# Patient Record
Sex: MALE | Race: WHITE | NOT HISPANIC OR LATINO | URBAN - METROPOLITAN AREA
[De-identification: names, ages, dates, MRNs, and addresses within clinical notes are randomized per-mention and may not be internally consistent; named-entity substitution may affect disease eponyms.]

---

## 2017-05-17 PROBLEM — Z00.00 ENCOUNTER FOR PREVENTIVE HEALTH EXAMINATION: Status: ACTIVE | Noted: 2017-05-17

## 2020-07-07 ENCOUNTER — EMERGENCY (EMERGENCY)
Facility: HOSPITAL | Age: 70
LOS: 1 days | Discharge: HOME | End: 2020-07-07
Attending: EMERGENCY MEDICINE | Admitting: EMERGENCY MEDICINE
Payer: MEDICARE

## 2020-07-07 VITALS — TEMPERATURE: 99 F

## 2020-07-07 VITALS
TEMPERATURE: 101 F | RESPIRATION RATE: 18 BRPM | SYSTOLIC BLOOD PRESSURE: 132 MMHG | DIASTOLIC BLOOD PRESSURE: 73 MMHG | WEIGHT: 212.97 LBS | HEIGHT: 71 IN | HEART RATE: 101 BPM | OXYGEN SATURATION: 96 %

## 2020-07-07 DIAGNOSIS — R50.9 FEVER, UNSPECIFIED: ICD-10-CM

## 2020-07-07 LAB
ALBUMIN SERPL ELPH-MCNC: 3.5 G/DL — SIGNIFICANT CHANGE UP (ref 3.3–5)
ALP SERPL-CCNC: 60 U/L — SIGNIFICANT CHANGE UP (ref 40–120)
ALT FLD-CCNC: 21 U/L — SIGNIFICANT CHANGE UP (ref 10–45)
ANION GAP SERPL CALC-SCNC: 15 MMOL/L — SIGNIFICANT CHANGE UP (ref 5–17)
APPEARANCE UR: ABNORMAL
APTT BLD: 30.5 SEC — SIGNIFICANT CHANGE UP (ref 27.5–35.5)
AST SERPL-CCNC: 14 U/L — SIGNIFICANT CHANGE UP (ref 10–40)
BACTERIA # UR AUTO: ABNORMAL /HPF
BASOPHILS # BLD AUTO: 0.02 K/UL — SIGNIFICANT CHANGE UP (ref 0–0.2)
BASOPHILS NFR BLD AUTO: 0.1 % — SIGNIFICANT CHANGE UP (ref 0–2)
BILIRUB SERPL-MCNC: 1 MG/DL — SIGNIFICANT CHANGE UP (ref 0.2–1.2)
BILIRUB UR-MCNC: NEGATIVE — SIGNIFICANT CHANGE UP
BUN SERPL-MCNC: 18 MG/DL — SIGNIFICANT CHANGE UP (ref 7–23)
CALCIUM SERPL-MCNC: 9.2 MG/DL — SIGNIFICANT CHANGE UP (ref 8.4–10.5)
CHLORIDE SERPL-SCNC: 102 MMOL/L — SIGNIFICANT CHANGE UP (ref 96–108)
CO2 SERPL-SCNC: 21 MMOL/L — LOW (ref 22–31)
COLOR SPEC: ABNORMAL
COMMENT - URINE: SIGNIFICANT CHANGE UP
CREAT SERPL-MCNC: 1.35 MG/DL — HIGH (ref 0.5–1.3)
DIFF PNL FLD: ABNORMAL
EOSINOPHIL # BLD AUTO: 0.07 K/UL — SIGNIFICANT CHANGE UP (ref 0–0.5)
EOSINOPHIL NFR BLD AUTO: 0.5 % — SIGNIFICANT CHANGE UP (ref 0–6)
EPI CELLS # UR: SIGNIFICANT CHANGE UP
GLUCOSE SERPL-MCNC: 126 MG/DL — HIGH (ref 70–99)
GLUCOSE UR QL: NEGATIVE — SIGNIFICANT CHANGE UP
HCT VFR BLD CALC: 42.9 % — SIGNIFICANT CHANGE UP (ref 39–50)
HGB BLD-MCNC: 14.8 G/DL — SIGNIFICANT CHANGE UP (ref 13–17)
IMM GRANULOCYTES NFR BLD AUTO: 0.5 % — SIGNIFICANT CHANGE UP (ref 0–1.5)
INR BLD: 1.17 RATIO — HIGH (ref 0.88–1.16)
KETONES UR-MCNC: NEGATIVE — SIGNIFICANT CHANGE UP
LACTATE SERPL-SCNC: 1 MMOL/L — SIGNIFICANT CHANGE UP (ref 0.7–2)
LEUKOCYTE ESTERASE UR-ACNC: ABNORMAL
LYMPHOCYTES # BLD AUTO: 0.48 K/UL — LOW (ref 1–3.3)
LYMPHOCYTES # BLD AUTO: 3.3 % — LOW (ref 13–44)
MCHC RBC-ENTMCNC: 31.8 PG — SIGNIFICANT CHANGE UP (ref 27–34)
MCHC RBC-ENTMCNC: 34.5 GM/DL — SIGNIFICANT CHANGE UP (ref 32–36)
MCV RBC AUTO: 92.1 FL — SIGNIFICANT CHANGE UP (ref 80–100)
MONOCYTES # BLD AUTO: 0.88 K/UL — SIGNIFICANT CHANGE UP (ref 0–0.9)
MONOCYTES NFR BLD AUTO: 6.1 % — SIGNIFICANT CHANGE UP (ref 2–14)
NEUTROPHILS # BLD AUTO: 12.95 K/UL — HIGH (ref 1.8–7.4)
NEUTROPHILS NFR BLD AUTO: 89.5 % — HIGH (ref 43–77)
NITRITE UR-MCNC: POSITIVE
NRBC # BLD: 0 /100 WBCS — SIGNIFICANT CHANGE UP (ref 0–0)
PH UR: 5 — SIGNIFICANT CHANGE UP (ref 5–8)
PLATELET # BLD AUTO: 285 K/UL — SIGNIFICANT CHANGE UP (ref 150–400)
POTASSIUM SERPL-MCNC: 3.9 MMOL/L — SIGNIFICANT CHANGE UP (ref 3.5–5.3)
POTASSIUM SERPL-SCNC: 3.9 MMOL/L — SIGNIFICANT CHANGE UP (ref 3.5–5.3)
PROT SERPL-MCNC: 7 G/DL — SIGNIFICANT CHANGE UP (ref 6–8.3)
PROT UR-MCNC: 100
PROTHROM AB SERPL-ACNC: 14.1 SEC — HIGH (ref 10.6–13.6)
RBC # BLD: 4.66 M/UL — SIGNIFICANT CHANGE UP (ref 4.2–5.8)
RBC # FLD: 12.5 % — SIGNIFICANT CHANGE UP (ref 10.3–14.5)
RBC CASTS # UR COMP ASSIST: ABNORMAL /HPF (ref 0–4)
SODIUM SERPL-SCNC: 138 MMOL/L — SIGNIFICANT CHANGE UP (ref 135–145)
SP GR SPEC: 1.01 — SIGNIFICANT CHANGE UP (ref 1.01–1.02)
UROBILINOGEN FLD QL: NEGATIVE — SIGNIFICANT CHANGE UP
WBC # BLD: 14.47 K/UL — HIGH (ref 3.8–10.5)
WBC # FLD AUTO: 14.47 K/UL — HIGH (ref 3.8–10.5)
WBC UR QL: ABNORMAL /HPF (ref 0–5)

## 2020-07-07 PROCEDURE — 85730 THROMBOPLASTIN TIME PARTIAL: CPT

## 2020-07-07 PROCEDURE — 85027 COMPLETE CBC AUTOMATED: CPT

## 2020-07-07 PROCEDURE — 96365 THER/PROPH/DIAG IV INF INIT: CPT | Mod: XU

## 2020-07-07 PROCEDURE — 93010 ELECTROCARDIOGRAM REPORT: CPT

## 2020-07-07 PROCEDURE — 51702 INSERT TEMP BLADDER CATH: CPT

## 2020-07-07 PROCEDURE — 71045 X-RAY EXAM CHEST 1 VIEW: CPT

## 2020-07-07 PROCEDURE — 87186 SC STD MICRODIL/AGAR DIL: CPT

## 2020-07-07 PROCEDURE — 81001 URINALYSIS AUTO W/SCOPE: CPT

## 2020-07-07 PROCEDURE — 93005 ELECTROCARDIOGRAM TRACING: CPT

## 2020-07-07 PROCEDURE — 80053 COMPREHEN METABOLIC PANEL: CPT

## 2020-07-07 PROCEDURE — 85610 PROTHROMBIN TIME: CPT

## 2020-07-07 PROCEDURE — 74176 CT ABD & PELVIS W/O CONTRAST: CPT

## 2020-07-07 PROCEDURE — 87040 BLOOD CULTURE FOR BACTERIA: CPT

## 2020-07-07 PROCEDURE — U0003: CPT

## 2020-07-07 PROCEDURE — 87086 URINE CULTURE/COLONY COUNT: CPT

## 2020-07-07 PROCEDURE — 71045 X-RAY EXAM CHEST 1 VIEW: CPT | Mod: 26

## 2020-07-07 PROCEDURE — 36415 COLL VENOUS BLD VENIPUNCTURE: CPT

## 2020-07-07 PROCEDURE — 99284 EMERGENCY DEPT VISIT MOD MDM: CPT | Mod: CS

## 2020-07-07 PROCEDURE — 74176 CT ABD & PELVIS W/O CONTRAST: CPT | Mod: 26

## 2020-07-07 PROCEDURE — 83605 ASSAY OF LACTIC ACID: CPT

## 2020-07-07 PROCEDURE — 99284 EMERGENCY DEPT VISIT MOD MDM: CPT | Mod: 25

## 2020-07-07 RX ORDER — CEFTRIAXONE 500 MG/1
1000 INJECTION, POWDER, FOR SOLUTION INTRAMUSCULAR; INTRAVENOUS ONCE
Refills: 0 | Status: COMPLETED | OUTPATIENT
Start: 2020-07-07 | End: 2020-07-07

## 2020-07-07 RX ORDER — CEPHALEXIN 500 MG
1 CAPSULE ORAL
Qty: 30 | Refills: 0
Start: 2020-07-07 | End: 2020-07-16

## 2020-07-07 RX ORDER — SODIUM CHLORIDE 9 MG/ML
2300 INJECTION INTRAMUSCULAR; INTRAVENOUS; SUBCUTANEOUS ONCE
Refills: 0 | Status: COMPLETED | OUTPATIENT
Start: 2020-07-07 | End: 2020-07-07

## 2020-07-07 RX ADMIN — CEFTRIAXONE 1000 MILLIGRAM(S): 500 INJECTION, POWDER, FOR SOLUTION INTRAMUSCULAR; INTRAVENOUS at 17:47

## 2020-07-07 RX ADMIN — SODIUM CHLORIDE 2300 MILLILITER(S): 9 INJECTION INTRAMUSCULAR; INTRAVENOUS; SUBCUTANEOUS at 17:47

## 2020-07-07 RX ADMIN — SODIUM CHLORIDE 2300 MILLILITER(S): 9 INJECTION INTRAMUSCULAR; INTRAVENOUS; SUBCUTANEOUS at 16:25

## 2020-07-07 RX ADMIN — CEFTRIAXONE 100 MILLIGRAM(S): 500 INJECTION, POWDER, FOR SOLUTION INTRAMUSCULAR; INTRAVENOUS at 16:25

## 2020-07-07 NOTE — ED PROVIDER NOTE - ATTENDING CONTRIBUTION TO CARE
Eval with SANDRA Thorpe. Patient presents to ED alert and oriented x3 with complaints of fever of 102.3 at home. Patient had lithotripsy procedure last week on Monday. Patient complains of urinary retention after ly removal on thursday. Patient states he self catheterizes himself at home, last time at 2pm today. Patient states he does not feel the urge to urinate at this moment but feels extreme lower pelvic pressure. Patient took tylenol at home for fever at 330pm. Patient denies chest pain, SOB, or other complaints at this time.  Urologist: Dr Gallito Quinteros.  spoke to LEESA Quintana from Dr. Figueroa office and pt was instructed not to self cath because of increased risk of infection. if pt well appearing can dc home on oral abx. discussed pts vitals meet sepsis criteria so will work him up for sepsis and reevaluate. Consider admission given pt presenting vitals, etc. Plan to discuss with urologist and determine disposition depending on pts ability to f/u and remain vigilant to return prn worsening. Awaiting call back. SANDRA Thorpe will discuss and dispo accordingly. Pt is retired physician and reliable. I performed a face to face bedside interview with patient regarding history of present illness, review of symptoms and past medical history. I completed an independent physical exam.  I have discussed the patient's plan of care with Physician Assistant (PA). I agree with note as stated above, having amended the EMR as needed to reflect my findings.   This includes History of Present Illness, HIV, Past Medical/Surgical/Family/Social History, Allergies and Home Medications, Review of Systems, Physical Exam, and any Progress Notes during the time I functioned as the attending physician for this patient.

## 2020-07-07 NOTE — ED PROVIDER NOTE - PHYSICAL EXAMINATION
General:     NAD, well-nourished, well-appearing  Eyes: PERRL  Head:     NC/AT, EOMI, oral mucosa moist  Neck:     trachea midline  Lungs:     CTA b/l  CVS:     RRR  Abd:     bladder distended, tender, no CVA TTP  Ext:   no deformities   Neuro: AAOx3, no sensory/motor deficits

## 2020-07-07 NOTE — ED ADULT NURSE NOTE - OBJECTIVE STATEMENT
Patient presents to ED alert and oriented x3 with complaints of fever of 102.3 at home. Patient had lithotripsy procedure last week on Monday. Patient complains of urinary retention after ly removal on thursday. Patient states he self catheterizes himself at home, last time at 2pm today. Patient states he does not feel the urge to urinate at this moment. Patient took tylenol at home for fever at 330pm. Patient denies chest pain, SOB, or other complaints at this time.

## 2020-07-07 NOTE — ED PROVIDER NOTE - NSFOLLOWUPINSTRUCTIONS_ED_ALL_ED_FT
Follow up with Dr.   Take antibiotics as prescribed.    Increase fluids.   Worsening pain, new fever, chills, nausea, vomiting return to ER       Urinary Tract Infection    A urinary tract infection (UTI) is an infection of any part of the urinary tract, which includes the kidneys, ureters, bladder, and urethra. Risk factors include ignoring your need to urinate, wiping back to front if female, being an uncircumcised male, and having diabetes or a weak immune system. Symptoms include frequent urination, pain or burning with urination, foul smelling urine, cloudy urine, pain in the lower abdomen, blood in the urine, and fever. If you were prescribed an antibiotic medicine, take it as told by your health care provider. Do not stop taking the antibiotic even if you start to feel better.    SEEK IMMEDIATE MEDICAL CARE IF YOU HAVE ANY OF THE FOLLOWING SYMPTOMS: severe back or abdominal pain, fever, inability to keep fluids or medicine down, dizziness/lightheadedness, or a change in mental status. Follow up with Dr. Quinteros. Call tomorrow morning to schedule a time to be seen 7/8/2020  Take antibiotics as prescribed.    Increase fluids.   Worsening pain, new fever, chills, nausea, vomiting return to ER       Urinary Tract Infection    A urinary tract infection (UTI) is an infection of any part of the urinary tract, which includes the kidneys, ureters, bladder, and urethra. Risk factors include ignoring your need to urinate, wiping back to front if female, being an uncircumcised male, and having diabetes or a weak immune system. Symptoms include frequent urination, pain or burning with urination, foul smelling urine, cloudy urine, pain in the lower abdomen, blood in the urine, and fever. If you were prescribed an antibiotic medicine, take it as told by your health care provider. Do not stop taking the antibiotic even if you start to feel better.    SEEK IMMEDIATE MEDICAL CARE IF YOU HAVE ANY OF THE FOLLOWING SYMPTOMS: severe back or abdominal pain, fever, inability to keep fluids or medicine down, dizziness/lightheadedness, or a change in mental status.

## 2020-07-07 NOTE — ED PROVIDER NOTE - CLINICAL SUMMARY MEDICAL DECISION MAKING FREE TEXT BOX
Urologist: Dr Gallito Quinteros. Patient presents to ED alert and oriented x3 with complaints of fever of 102.3 at home. Patient had lithotripsy procedure last week on Monday. Patient complains of urinary retention after ly removal on thursday. Patient states he self catheterizes himself at home, last time at 2pm today. Patient states he does not feel the urge to urinate at this moment but feels extreme lower pelvic pressure. Patient took tylenol at home for fever at 330pm. Patient denies chest pain, SOB, or other complaints at this time.  Urologist: Dr Gallito Quinteros.  spoke to LEESA Quintana from Dr. Figueroa office and pt was instructed not to self cath because of increased risk of infection. if pt well appearing can dc home on oral abx. discussed pts vitals meet sepsis criteria so will work him up for sepsis and reevaluate  discussed with Dr. Figueroa office pts results. pt well appearing. will dc with keflex tid and will follow up in the office tomorrow. Discussed with patient need to return to ED if symptoms don't continue to improve or recur or develops any new or worsening symptoms that are of concern. Patient presents to ED alert and oriented x3 with complaints of fever of 102.3 at home. Patient had lithotripsy procedure last week on Monday. Patient complains of urinary retention after ly removal on thursday. Patient states he self catheterizes himself at home, last time at 2pm today. Patient states he does not feel the urge to urinate at this moment but feels extreme lower pelvic pressure. Patient took tylenol at home for fever at 330pm. Patient denies chest pain, SOB, or other complaints at this time.  Urologist: Dr Gallito Quinteros.  spoke to LEESA Quintana from Dr. Figueroa office and pt was instructed not to self cath because of increased risk of infection. if pt well appearing can dc home on oral abx. discussed pts vitals meet sepsis criteria so will work him up for sepsis and reevaluate.  discussed with Dr. Figueroa office pts results. pt well appearing. will dc with keflex tid and will follow up in the office tomorrow. Discussed with patient need to return to ED if symptoms don't continue to improve or recur or develops any new or worsening symptoms that are of concern.

## 2020-07-07 NOTE — ED PROVIDER NOTE - OBJECTIVE STATEMENT
Patient presents to ED alert and oriented x3 with complaints of fever of 102.3 at home. Patient had lithotripsy procedure last week on Monday. Patient complains of urinary retention after ly removal on thursday. Patient states he self catheterizes himself at home, last time at 2pm today. Patient states he does not feel the urge to urinate at this moment but feels extreme lower pelvic pressure. Patient took tylenol at home for fever at 330pm. Patient denies chest pain, SOB, or other complaints at this time.

## 2020-07-07 NOTE — ED ADULT TRIAGE NOTE - CHIEF COMPLAINT QUOTE
Pt c/o fever started last night, s/p lithotripsy last week, Monday, ly removed last Thursday, started self catheterizing last Friday due to retention, took 2 tylenol at 330 pm

## 2020-07-07 NOTE — ED ADULT NURSE REASSESSMENT NOTE - NS ED NURSE REASSESS COMMENT FT1
ly cath inserted without any difficulty, urine draining, patient reports feeling better Not applicable

## 2020-07-07 NOTE — ED PROVIDER NOTE - PATIENT PORTAL LINK FT
You can access the FollowMyHealth Patient Portal offered by [facility] by registering at the following website: [email or web address]. By joining [facility]’s FollowMyHealth portal, you will also be able to view your health information using other applications (apps) compatible with our system.

## 2020-07-07 NOTE — ED PROVIDER NOTE - PROGRESS NOTE DETAILS
discussed with Dr. Figueroa office pts results. pt well appearing. will dc with keflex tid and will follow up in the office tomorrow. Discussed with patient need to return to ED if symptoms don't continue to improve or recur or develops any new or worsening symptoms that are of concern. spoke to LEESA Quintana from Dr. Figueroa office and pt was instructed not to self cath because of increased risk of infection. if pt well appearing can dc home on oral abx. discussed pts vitals meet sepsis criteria so will work him up for sepsis and reevaluate

## 2020-07-08 LAB — SARS-COV-2 RNA SPEC QL NAA+PROBE: SIGNIFICANT CHANGE UP

## 2020-07-12 LAB
CULTURE RESULTS: SIGNIFICANT CHANGE UP
CULTURE RESULTS: SIGNIFICANT CHANGE UP
SPECIMEN SOURCE: SIGNIFICANT CHANGE UP
SPECIMEN SOURCE: SIGNIFICANT CHANGE UP

## 2021-05-12 PROBLEM — N40.0 BENIGN PROSTATIC HYPERPLASIA WITHOUT LOWER URINARY TRACT SYMPTOMS: Chronic | Status: ACTIVE | Noted: 2020-07-07

## 2021-05-13 ENCOUNTER — APPOINTMENT (OUTPATIENT)
Dept: PULMONOLOGY | Facility: CLINIC | Age: 71
End: 2021-05-13
Payer: MEDICARE

## 2021-05-13 VITALS
SYSTOLIC BLOOD PRESSURE: 146 MMHG | TEMPERATURE: 98.9 F | DIASTOLIC BLOOD PRESSURE: 96 MMHG | HEART RATE: 80 BPM | BODY MASS INDEX: 31.5 KG/M2 | WEIGHT: 220 LBS | HEIGHT: 70 IN | OXYGEN SATURATION: 97 %

## 2021-05-13 DIAGNOSIS — Z86.2 PERSONAL HISTORY OF DISEASES OF THE BLOOD AND BLOOD-FORMING ORGANS AND CERTAIN DISORDERS INVOLVING THE IMMUNE MECHANISM: ICD-10-CM

## 2021-05-13 DIAGNOSIS — Z87.891 PERSONAL HISTORY OF NICOTINE DEPENDENCE: ICD-10-CM

## 2021-05-13 DIAGNOSIS — I10 ESSENTIAL (PRIMARY) HYPERTENSION: ICD-10-CM

## 2021-05-13 DIAGNOSIS — R06.02 SHORTNESS OF BREATH: ICD-10-CM

## 2021-05-13 DIAGNOSIS — R06.2 WHEEZING: ICD-10-CM

## 2021-05-13 DIAGNOSIS — Z80.1 FAMILY HISTORY OF MALIGNANT NEOPLASM OF TRACHEA, BRONCHUS AND LUNG: ICD-10-CM

## 2021-05-13 PROCEDURE — 94729 DIFFUSING CAPACITY: CPT

## 2021-05-13 PROCEDURE — 94727 GAS DIL/WSHOT DETER LNG VOL: CPT

## 2021-05-13 PROCEDURE — 99203 OFFICE O/P NEW LOW 30 MIN: CPT | Mod: 25

## 2021-05-13 PROCEDURE — 94060 EVALUATION OF WHEEZING: CPT

## 2021-05-13 RX ORDER — AMLODIPINE BESYLATE 5 MG/1
5 TABLET ORAL
Refills: 0 | Status: ACTIVE | COMMUNITY
Start: 2021-05-13

## 2021-05-13 RX ORDER — ALBUTEROL SULFATE 90 UG/1
108 (90 BASE) INHALANT RESPIRATORY (INHALATION)
Qty: 1 | Refills: 3 | Status: ACTIVE | COMMUNITY
Start: 2021-05-13 | End: 1900-01-01

## 2021-05-13 RX ORDER — LOSARTAN POTASSIUM 50 MG/1
50 TABLET, FILM COATED ORAL
Refills: 0 | Status: ACTIVE | COMMUNITY
Start: 2021-05-13

## 2021-05-13 RX ORDER — FERROUS SULFATE 325(65) MG
325 (65 FE) TABLET ORAL
Refills: 0 | Status: ACTIVE | COMMUNITY
Start: 2021-05-13

## 2021-05-13 NOTE — HISTORY OF PRESENT ILLNESS
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 He denies cough, chest pain or hemoptysis. He has a large hiatal hernia with PRN GERD like complaints. He has not been treated with inhaled meds in the past. [YearQuit] : 1982 [TextBox_29] : Denies snoring, daytime somnolence, apneic episodes, AM headaches

## 2021-05-13 NOTE — REVIEW OF SYSTEMS
[Negative] : Endocrine [Nasal Congestion] : nasal congestion [Cough] : no cough [Sputum] : no sputum [Wheezing] : wheezing [SOB on Exertion] : sob on exertion [GERD] : gerd

## 2021-05-13 NOTE — DISCUSSION/SUMMARY
[FreeTextEntry1] : 70 yo male with PRN GOMEZ and wheezing with mild bronchodilator response on PFT. His mild obstructive airways disease in part,explains his symptoms which are likely made worse by gastric reflux given significant hiatal hernia. The latter in itself does not appear to be causing his respiratory problems given normal lung volumes and diffusion on PFT. He was given a sample of trelegy to use for two weeks with PRN albuterol MDI. Treatment adjustment will depend on symptomatic needs.He is to follow up with his PMD as before.

## 2021-11-18 NOTE — ED PROVIDER NOTE - NS ED MD EM SELECTION
[id number] Comprehensive
Acute Abdominal Pain in Children    WHAT YOU NEED TO KNOW:    The cause of your child's abdominal pain may not be found. If a cause is found, treatment will depend on what the cause is.     DISCHARGE INSTRUCTIONS:    Seek care immediately if:     Your child's bowel movement has blood in it, or looks like black tar.     Your child is bleeding from his or her rectum.     Your child cannot stop vomiting, or vomits blood.    Your child's abdomen is larger than usual, very painful, and hard.     Your child has severe pain in his or her abdomen.     Your child feels weak, dizzy, or faint.    Your child stops passing gas and having bowel movements.     Contact your child's healthcare provider if:     Your child has a fever.    Your child has new symptoms.     Your child's symptoms do not get better with treatment.     You have questions or concerns about your child's condition or care.    Medicines may be given to decrease pain, treat a bacterial infection, or manage your child's symptoms. Give your child's medicine as directed. Call your child's healthcare provider if you think the medicine is not working as expected. Tell him if your child is allergic to any medicine. Keep a current list of the medicines, vitamins, and herbs your child takes. Include the amounts, and when, how, and why they are taken. Bring the list or the medicines in their containers to follow-up visits. Carry your child's medicine list with you in case of an emergency.    Care for your child:     Apply heat on your child's abdomen for 20 to 30 minutes every 2 hours. Do this for as many days as directed. Heat helps decrease pain and muscle spasms.    Help your child manage stress. Your child's healthcare provider may recommend relaxation techniques and deep breathing exercises to help decrease your child's stress. The provider may recommend that your child talk to someone about his or her stress or anxiety, such as a school counselor.     Make changes to the foods you give to your child as directed.  Give your child more fiber if he has constipation. High-fiber foods include fruits, vegetables, whole-grain foods, and legumes.     Do not give your child foods that cause gas, such as broccoli, cabbage, and cauliflower. Do not give him soda or carbonated drinks, because these may also cause gas.     Do not give your child foods or drinks that contain sorbitol or fructose if he has diarrhea and bloating. Some examples are fruit juices, candy, jelly, and sugar-free gum. Do not give him high-fat foods, such as fried foods, cheeseburgers, hot dogs, and desserts.    Give your child small meals more often. This may help decrease his abdominal pain.     Follow up with your child's healthcare provider as directed: Write down your questions so you remember to ask them during your child's visits.

## 2022-01-14 ENCOUNTER — APPOINTMENT (OUTPATIENT)
Dept: PULMONOLOGY | Facility: CLINIC | Age: 72
End: 2022-01-14
Payer: MEDICARE

## 2022-01-14 VITALS
OXYGEN SATURATION: 90 % | WEIGHT: 215 LBS | DIASTOLIC BLOOD PRESSURE: 86 MMHG | TEMPERATURE: 98 F | HEART RATE: 84 BPM | SYSTOLIC BLOOD PRESSURE: 130 MMHG | BODY MASS INDEX: 30.85 KG/M2

## 2022-01-14 DIAGNOSIS — J45.909 UNSPECIFIED ASTHMA, UNCOMPLICATED: ICD-10-CM

## 2022-01-14 DIAGNOSIS — K21.9 GASTRO-ESOPHAGEAL REFLUX DISEASE W/OUT ESOPHAGITIS: ICD-10-CM

## 2022-01-14 DIAGNOSIS — K44.9 GASTRO-ESOPHAGEAL REFLUX DISEASE W/OUT ESOPHAGITIS: ICD-10-CM

## 2022-01-14 DIAGNOSIS — R05.9 COUGH, UNSPECIFIED: ICD-10-CM

## 2022-01-14 PROCEDURE — 99214 OFFICE O/P EST MOD 30 MIN: CPT

## 2022-01-29 PROBLEM — K21.9 HIATAL HERNIA WITH GERD: Status: ACTIVE | Noted: 2022-01-29

## 2022-01-29 PROBLEM — J45.909 AIRWAY HYPERREACTIVITY: Status: ACTIVE | Noted: 2021-05-13

## 2022-01-29 PROBLEM — R05.9 COUGH: Status: ACTIVE | Noted: 2022-01-29

## 2022-01-29 NOTE — HISTORY OF PRESENT ILLNESS
[Former] : former [< 30 pack-years] : < 30 pack-years [TextBox_4] : 70 yo male with hx of PRN dry cough and significant hiatal hernia, presents for follow up. Since last visit eight months ago, the patient continues to have PRN dry cough which has increased in frequency over the last two months. He last used albuterol MDI six weeks ago. He denies fever, chest pain or hemoptysis.He is covid 19 vaccinated and boosted.  [YearQuit] : 1982 [TextBox_29] : Denies snoring, daytime somnolence, apneic episodes, AM headaches

## 2022-01-29 NOTE — REVIEW OF SYSTEMS
[Nasal Congestion] : no nasal congestion [Cough] : cough [Sputum] : no sputum [Wheezing] : no wheezing [SOB on Exertion] : no sob on exertion [GERD] : gerd [Negative] : Endocrine

## 2022-01-29 NOTE — DISCUSSION/SUMMARY
[FreeTextEntry1] : 70 yo male with cough, likely related to GERD. GERD treatment stressed. He is to use albuterol MDI PRN for cough. Treatment adjustment will depend on symptomatic needs as before.He is to follow up with his PMD as before.

## 2024-07-11 ENCOUNTER — APPOINTMENT (OUTPATIENT)
Dept: PULMONOLOGY | Facility: CLINIC | Age: 74
End: 2024-07-11

## 2024-07-11 VITALS
DIASTOLIC BLOOD PRESSURE: 82 MMHG | HEART RATE: 86 BPM | OXYGEN SATURATION: 96 % | WEIGHT: 221 LBS | BODY MASS INDEX: 31.71 KG/M2 | SYSTOLIC BLOOD PRESSURE: 110 MMHG | TEMPERATURE: 97.5 F

## 2024-07-11 PROCEDURE — 94060 EVALUATION OF WHEEZING: CPT

## 2024-07-11 PROCEDURE — 99214 OFFICE O/P EST MOD 30 MIN: CPT | Mod: 25

## 2024-07-11 PROCEDURE — 94727 GAS DIL/WSHOT DETER LNG VOL: CPT

## 2024-07-11 PROCEDURE — 94729 DIFFUSING CAPACITY: CPT

## 2024-07-12 RX ORDER — ALBUTEROL SULFATE 90 UG/1
108 (90 BASE) INHALANT RESPIRATORY (INHALATION)
Qty: 1 | Refills: 3 | Status: ACTIVE | COMMUNITY
Start: 2024-07-12 | End: 1900-01-01

## 2024-08-12 ENCOUNTER — TRANSCRIPTION ENCOUNTER (OUTPATIENT)
Age: 74
End: 2024-08-12

## 2024-08-13 ENCOUNTER — EMERGENCY (EMERGENCY)
Facility: HOSPITAL | Age: 74
LOS: 1 days | Discharge: HOME | End: 2024-08-13
Attending: EMERGENCY MEDICINE | Admitting: EMERGENCY MEDICINE
Payer: MEDICARE

## 2024-08-13 VITALS
SYSTOLIC BLOOD PRESSURE: 107 MMHG | HEART RATE: 88 BPM | RESPIRATION RATE: 18 BRPM | TEMPERATURE: 98 F | OXYGEN SATURATION: 96 % | DIASTOLIC BLOOD PRESSURE: 78 MMHG | WEIGHT: 216.93 LBS | HEIGHT: 70 IN

## 2024-08-13 DIAGNOSIS — Z98.890 OTHER SPECIFIED POSTPROCEDURAL STATES: Chronic | ICD-10-CM

## 2024-08-13 PROBLEM — I10 ESSENTIAL (PRIMARY) HYPERTENSION: Chronic | Status: ACTIVE | Noted: 2024-08-08

## 2024-08-13 PROBLEM — N21.0 CALCULUS IN BLADDER: Chronic | Status: ACTIVE | Noted: 2024-08-08

## 2024-08-13 PROBLEM — K57.92 DIVERTICULITIS OF INTESTINE, PART UNSPECIFIED, WITHOUT PERFORATION OR ABSCESS WITHOUT BLEEDING: Chronic | Status: ACTIVE | Noted: 2024-08-08

## 2024-08-13 PROCEDURE — 99282 EMERGENCY DEPT VISIT SF MDM: CPT

## 2024-08-13 PROCEDURE — 99283 EMERGENCY DEPT VISIT LOW MDM: CPT

## 2024-08-13 NOTE — ED PROVIDER NOTE - OBJECTIVE STATEMENT
74-year-old male presents to the emergency department with right forearm local cordlike tenderness.  Patient was recently inpatient had an IV in this location.  No fever.  Mild redness.  No arm swelling.  No numbness or tingling.

## 2024-08-13 NOTE — ED PROVIDER NOTE - NSFOLLOWUPINSTRUCTIONS_ED_ALL_ED_FT
Phlebitis  Phlebitis is soreness and swelling (inflammation) of a vein. In most cases, this condition is not serious and gets better quickly.    What are the causes?  This condition may be caused by:  Having any of these put in the vein:  A needle.  An IV tube.  A long, thin tube called a catheter.  Getting certain IV medicines or solutions that can irritate the vein.  Having an IV tube for a long time or in a part of the body that moves a lot.  A blood clot.  Infection of the vein.  Surgery on a vein.  What increases the risk?  Being overweight or very overweight (obese).  Pregnancy.  Cancer.  Reduced or slowing of blood flow through your veins. This can be caused by:  Bed rest over a long period of time.  Long-distance travel.  Injury.  Surgery.  Congestive heart failure.  Inactivity.  Smoking.  Taking birth control pills or hormone replacement therapy.  Varicose veins.  Diseases or blood disorders that increase clotting.  Taking drugs through the vein.  Having a history of blood clots.  What are the signs or symptoms?  A red, tender, swollen, and painful area on your skin. Usually, the area is long and narrow. It may spread.  The affected area feeling warm to the touch.  Firmness along the center of the affected area.  Low fever.  How is this treated?  Treatment may include:  Using a heating pad or other heat source.  Wearing stockings or bandages that put pressure (compression) on the area.  Taking medicines.  Removing an IV tube.  Changing a medicine or IV solution that is causing the problem.  In rare cases, surgery may be needed.    Follow these instructions at home:  Managing pain, stiffness, and swelling      If told, put heat on the affected area. Do this as often as told by your doctor. Use the heat source that your doctor recommends, such as a moist heat pack or a heating pad.  Place a towel between your skin and the heat source.  Leave the heat on for 20–30 minutes.  Take off the heat if your skin turns bright red. This is very important. If you cannot feel pain, heat, or cold, you have a greater risk of getting burned.  Raise the affected area above the level of your heart while you are sitting or lying down.  Medicines    Take over-the-counter and prescription medicines only as told by your doctor.  If you were prescribed an antibiotic medicine, take it as told by your doctor. Do not stop taking it even if you start to feel better.  If you are taking blood thinners:  Talk with your doctor before taking any medicines that have aspirin or NSAIDs, such as ibuprofen.  Take medicines exactly as told. Take them at the same time each day.  Avoid doing things that could hurt or bruise you. Take action to prevent falls.  Wear an alert bracelet or carry a card that shows you are taking blood thinners.  General instructions    If you have phlebitis in your legs:  Do not sit or lie down for a long time.  Keep your legs moving.  Get up and take short walks if you have to sit for a long time.  Try to avoid bed rest that lasts for a long time. Regular sleep is not bed rest.  Wear compression stockings or bandages as told by your doctor. These stockings help:  To reduce swelling in your legs.  To prevent blood clots.  To stop the condition from coming back.  Do not smoke or use any products that contain nicotine or tobacco. If you need help quitting, ask your doctor.  Keep all follow-up visits.  Contact a doctor if:  You have strange bruises.  You have bleeding problems.  Your symptoms do not get better.  Your symptoms get worse.  You are taking medicine to treat swelling (anti-inflammatory medicine) and you get pain in your belly (abdomen).  Get help right away if:  You have sudden chest pain.  You suddenly have trouble breathing.  You have a fever and your symptoms get worse.  You cough up blood.  You feel dizzy or you faint.  You have very bad pain and swelling in the affected arm or leg.  These symptoms may be an emergency. Get help right away. Call your local emergency services (911 in the [place]  Do not wait to see if the symptoms will go away.  Do not drive yourself to the hospital.  Summary  Phlebitis is soreness and swelling (inflammation) of a vein.  Raise the affected area above the level of your heart while you are sitting or lying down.  If told, put heat on the affected area. Do this as often as told by your doctor.  Take over-the-counter and prescription medicines only as told by your doctor.  This information is not intended to replace advice given to you by your health care provider. Make sure you discuss any questions you have with your health care provider.

## 2024-08-13 NOTE — ED PROVIDER NOTE - CLINICAL SUMMARY MEDICAL DECISION MAKING FREE TEXT BOX
74-year-old male presents to the emergency department with right forearm local cordlike tenderness.  Patient was recently inpatient had an IV in this location.  No fever.  Mild redness.  No arm swelling.  No numbness or tingling.  Exam as stated. Pt on augmentin already for colonic issues. Advised to continue. Warm compresses.   1) Follow up with your doctor in 1-2 days  2) Return to the ER for worsening or concerning symptoms   Appt with PCP Thursday already set.

## 2024-08-13 NOTE — ED PROVIDER NOTE - PATIENT PORTAL LINK FT
You can access the FollowMyHealth Patient Portal offered by [facility] by registering at the following website: [email or web address]. By joining [facility]’s FollowMyHealth portal, you will also be able to view your health information using other applications (apps) compatible with our system.

## 2024-08-13 NOTE — ED ADULT NURSE NOTE - OBJECTIVE STATEMENT
Pt presents to ED from home for evaluation. Pt states he recently was discharged from the hospital and had an IV in the right forearm that he wants evaluated. Pt with mild redness and some tenderness at site. Pt is currently taking augmentin PO. Afebrile on arrival.

## 2024-08-13 NOTE — ED ADULT NURSE NOTE - NSICDXPASTMEDICALHX_GEN_ALL_CORE_FT
PAST MEDICAL HISTORY:  Bladder stone     BPH (benign prostatic hyperplasia)     Diverticulitis     Hypertension

## 2024-08-13 NOTE — ED ADULT TRIAGE NOTE - CHIEF COMPLAINT QUOTE
Patient complaint of tenderness to IV site to R FA since yesterday. Patient is concern for infection. Patient is currently Augmentin.

## 2024-08-16 ENCOUNTER — NON-APPOINTMENT (OUTPATIENT)
Age: 74
End: 2024-08-16

## 2024-08-16 ENCOUNTER — APPOINTMENT (OUTPATIENT)
Dept: FAMILY MEDICINE | Facility: CLINIC | Age: 74
End: 2024-08-16
Payer: MEDICARE

## 2024-08-16 VITALS
WEIGHT: 217 LBS | BODY MASS INDEX: 31.14 KG/M2 | OXYGEN SATURATION: 97 % | HEART RATE: 80 BPM | RESPIRATION RATE: 16 BRPM | DIASTOLIC BLOOD PRESSURE: 70 MMHG | TEMPERATURE: 97.6 F | SYSTOLIC BLOOD PRESSURE: 111 MMHG

## 2024-08-16 DIAGNOSIS — Z86.2 PERSONAL HISTORY OF DISEASES OF THE BLOOD AND BLOOD-FORMING ORGANS AND CERTAIN DISORDERS INVOLVING THE IMMUNE MECHANISM: ICD-10-CM

## 2024-08-16 DIAGNOSIS — Z87.898 PERSONAL HISTORY OF OTHER SPECIFIED CONDITIONS: ICD-10-CM

## 2024-08-16 DIAGNOSIS — Z87.09 PERSONAL HISTORY OF OTHER DISEASES OF THE RESPIRATORY SYSTEM: ICD-10-CM

## 2024-08-16 DIAGNOSIS — E78.5 HYPERLIPIDEMIA, UNSPECIFIED: ICD-10-CM

## 2024-08-16 DIAGNOSIS — I10 ESSENTIAL (PRIMARY) HYPERTENSION: ICD-10-CM

## 2024-08-16 DIAGNOSIS — N40.0 BENIGN PROSTATIC HYPERPLASIA WITHOUT LOWER URINARY TRACT SYMPMS: ICD-10-CM

## 2024-08-16 DIAGNOSIS — Z00.00 ENCOUNTER FOR GENERAL ADULT MEDICAL EXAMINATION W/OUT ABNORMAL FINDINGS: ICD-10-CM

## 2024-08-16 PROCEDURE — G0438: CPT

## 2024-08-16 RX ORDER — ATORVASTATIN CALCIUM 10 MG/1
10 TABLET, FILM COATED ORAL
Refills: 0 | Status: ACTIVE | COMMUNITY
Start: 2024-08-16

## 2024-08-16 RX ORDER — TELMISARTAN 40 MG/1
40 TABLET ORAL
Refills: 0 | Status: ACTIVE | COMMUNITY
Start: 2024-08-16

## 2024-08-16 NOTE — HISTORY OF PRESENT ILLNESS
[FreeTextEntry1] : AWV, establish care  [de-identified] : 73yo male with history of diverticulitis, BPH, HTN presents for AWV. Recently in ER for intestinal abscess, was observed and d/c with oral antibiotics. Feeling much better now. Reports remote history of anemia likely secondary to GI bleed but had extensive eval and could not determine source. Stopped aspirin and started iron with improvement. Has not had issues since.   Currently on telmisartan 40mg qam, amlodipine 10mg mark   Gastroenterology - has been seeing him for 13 years ago  Bunion surgical correction 2014  Follows with urology - h/o bladder stones, BPH   Has had many EKGs which have always been normal.

## 2024-08-16 NOTE — HEALTH RISK ASSESSMENT
[Good] : ~his/her~  mood as  good [Yes] : Yes [2 - 4 times a month (2 pts)] : 2-4 times a month (2 points) [1 or 2 (0 pts)] : 1 or 2 (0 points) [Never (0 pts)] : Never (0 points) [No] : In the past 12 months have you used drugs other than those required for medical reasons? No [No falls in past year] : Patient reported no falls in the past year [0] : 2) Feeling down, depressed, or hopeless: Not at all (0) [PHQ-2 Negative - No further assessment needed] : PHQ-2 Negative - No further assessment needed [Never] : Never [Patient reported colonoscopy was normal] : Patient reported colonoscopy was normal [None] : None [With Significant Other] : lives with significant other [Significant Other] : lives with significant other [Fully functional (bathing, dressing, toileting, transferring, walking, feeding)] : Fully functional (bathing, dressing, toileting, transferring, walking, feeding) [Fully functional (using the telephone, shopping, preparing meals, housekeeping, doing laundry, using] : Fully functional and needs no help or supervision to perform IADLs (using the telephone, shopping, preparing meals, housekeeping, doing laundry, using transportation, managing medications and managing finances) [Audit-CScore] : 2 [BJT5Exndn] : 0 [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

## 2024-08-30 ENCOUNTER — APPOINTMENT (OUTPATIENT)
Dept: CT IMAGING | Facility: HOSPITAL | Age: 74
End: 2024-08-30
Payer: MEDICARE

## 2024-08-30 ENCOUNTER — OUTPATIENT (OUTPATIENT)
Dept: OUTPATIENT SERVICES | Facility: HOSPITAL | Age: 74
LOS: 1 days | End: 2024-08-30
Payer: MEDICARE

## 2024-08-30 DIAGNOSIS — K57.30 DIVERTICULOSIS OF LARGE INTESTINE WITHOUT PERFORATION OR ABSCESS WITHOUT BLEEDING: ICD-10-CM

## 2024-08-30 PROCEDURE — 74177 CT ABD & PELVIS W/CONTRAST: CPT | Mod: MH

## 2024-08-30 PROCEDURE — 74177 CT ABD & PELVIS W/CONTRAST: CPT | Mod: 26,MH

## 2024-09-03 DIAGNOSIS — R79.89 OTHER SPECIFIED ABNORMAL FINDINGS OF BLOOD CHEMISTRY: ICD-10-CM

## 2024-09-03 RX ORDER — CHOLECALCIFEROL (VITAMIN D3) 1250 MCG
1.25 MG CAPSULE ORAL
Qty: 12 | Refills: 1 | Status: ACTIVE | COMMUNITY
Start: 2024-09-03 | End: 1900-01-01

## 2024-09-16 DIAGNOSIS — S40.269A INSECT BITE (NONVENOMOUS) OF UNSPECIFIED SHOULDER, INITIAL ENCOUNTER: ICD-10-CM
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
## 2024-09-16 RX ORDER — DOXYCYCLINE HYCLATE 100 MG/1
100 TABLET ORAL DAILY
Qty: 2 | Refills: 0 | Status: ACTIVE | COMMUNITY
Start: 2024-09-16 | End: 1900-01-01

## 2024-09-24 LAB — B BURGDOR DNA TICK QL NAA+PROBE: NOT DETECTED

## 2024-10-03 ENCOUNTER — TRANSCRIPTION ENCOUNTER (OUTPATIENT)
Age: 74
End: 2024-10-03

## 2024-11-15 ENCOUNTER — APPOINTMENT (OUTPATIENT)
Dept: FAMILY MEDICINE | Facility: CLINIC | Age: 74
End: 2024-11-15
Payer: COMMERCIAL

## 2024-11-15 DIAGNOSIS — Z23 ENCOUNTER FOR IMMUNIZATION: ICD-10-CM

## 2024-11-15 PROCEDURE — G0008: CPT

## 2024-11-15 PROCEDURE — 90662 IIV NO PRSV INCREASED AG IM: CPT

## 2025-01-17 ENCOUNTER — APPOINTMENT (OUTPATIENT)
Dept: PULMONOLOGY | Facility: CLINIC | Age: 75
End: 2025-01-17
Payer: MEDICARE

## 2025-01-17 VITALS
WEIGHT: 216 LBS | BODY MASS INDEX: 30.99 KG/M2 | HEART RATE: 68 BPM | TEMPERATURE: 97.6 F | SYSTOLIC BLOOD PRESSURE: 124 MMHG | DIASTOLIC BLOOD PRESSURE: 80 MMHG | OXYGEN SATURATION: 96 %

## 2025-01-17 PROCEDURE — 99214 OFFICE O/P EST MOD 30 MIN: CPT

## 2025-02-03 ENCOUNTER — APPOINTMENT (OUTPATIENT)
Dept: FAMILY MEDICINE | Facility: CLINIC | Age: 75
End: 2025-02-03
Payer: MEDICARE

## 2025-02-03 VITALS
SYSTOLIC BLOOD PRESSURE: 147 MMHG | OXYGEN SATURATION: 95 % | WEIGHT: 216 LBS | DIASTOLIC BLOOD PRESSURE: 70 MMHG | HEART RATE: 88 BPM | TEMPERATURE: 97.6 F | BODY MASS INDEX: 30.99 KG/M2 | RESPIRATION RATE: 14 BRPM

## 2025-02-03 DIAGNOSIS — R79.89 OTHER SPECIFIED ABNORMAL FINDINGS OF BLOOD CHEMISTRY: ICD-10-CM

## 2025-02-03 DIAGNOSIS — D64.9 ANEMIA, UNSPECIFIED: ICD-10-CM

## 2025-02-03 DIAGNOSIS — I10 ESSENTIAL (PRIMARY) HYPERTENSION: ICD-10-CM

## 2025-02-03 DIAGNOSIS — E78.5 HYPERLIPIDEMIA, UNSPECIFIED: ICD-10-CM

## 2025-02-03 PROCEDURE — G2211 COMPLEX E/M VISIT ADD ON: CPT

## 2025-02-03 PROCEDURE — 99214 OFFICE O/P EST MOD 30 MIN: CPT

## 2025-02-03 RX ORDER — FERROUS GLUCONATE 324(38)MG
324 (38 FE) TABLET ORAL TWICE DAILY
Qty: 180 | Refills: 3 | Status: ACTIVE | COMMUNITY
Start: 2025-02-03 | End: 1900-01-01

## 2025-02-04 LAB
25(OH)D3 SERPL-MCNC: 86.8 NG/ML
ALBUMIN SERPL ELPH-MCNC: 4.1 G/DL
ALP BLD-CCNC: 76 U/L
ALT SERPL-CCNC: 16 U/L
ANION GAP SERPL CALC-SCNC: 10 MMOL/L
AST SERPL-CCNC: 17 U/L
BASOPHILS # BLD AUTO: 0.04 K/UL
BASOPHILS NFR BLD AUTO: 0.7 %
BILIRUB SERPL-MCNC: 0.6 MG/DL
BUN SERPL-MCNC: 22 MG/DL
CALCIUM SERPL-MCNC: 9.6 MG/DL
CHLORIDE SERPL-SCNC: 105 MMOL/L
CHOLEST SERPL-MCNC: 164 MG/DL
CO2 SERPL-SCNC: 26 MMOL/L
CREAT SERPL-MCNC: 1.03 MG/DL
EGFR: 77 ML/MIN/1.73M2
EOSINOPHIL # BLD AUTO: 0.17 K/UL
EOSINOPHIL NFR BLD AUTO: 3.2 %
ESTIMATED AVERAGE GLUCOSE: 123 MG/DL
FERRITIN SERPL-MCNC: 53 NG/ML
FOLATE SERPL-MCNC: >20 NG/ML
GLUCOSE SERPL-MCNC: 111 MG/DL
HAPTOGLOB SERPL-MCNC: 202 MG/DL
HBA1C MFR BLD HPLC: 5.9 %
HCT VFR BLD CALC: 42.7 %
HDLC SERPL-MCNC: 64 MG/DL
HGB BLD-MCNC: 14.3 G/DL
IMM GRANULOCYTES NFR BLD AUTO: 0.6 %
IRON SATN MFR SERPL: 40 %
IRON SERPL-MCNC: 136 UG/DL
LDLC SERPL CALC-MCNC: 81 MG/DL
LYMPHOCYTES # BLD AUTO: 1.15 K/UL
LYMPHOCYTES NFR BLD AUTO: 21.4 %
MAN DIFF?: NORMAL
MCHC RBC-ENTMCNC: 30.4 PG
MCHC RBC-ENTMCNC: 33.5 G/DL
MCV RBC AUTO: 90.7 FL
MONOCYTES # BLD AUTO: 0.38 K/UL
MONOCYTES NFR BLD AUTO: 7.1 %
NEUTROPHILS # BLD AUTO: 3.6 K/UL
NEUTROPHILS NFR BLD AUTO: 67 %
NONHDLC SERPL-MCNC: 100 MG/DL
PLATELET # BLD AUTO: 224 K/UL
POTASSIUM SERPL-SCNC: 4.8 MMOL/L
PROT SERPL-MCNC: 6.1 G/DL
RBC # BLD: 4.71 M/UL
RBC # FLD: 13.5 %
SODIUM SERPL-SCNC: 141 MMOL/L
TIBC SERPL-MCNC: 338 UG/DL
TRIGL SERPL-MCNC: 111 MG/DL
UIBC SERPL-MCNC: 202 UG/DL
VIT B12 SERPL-MCNC: 586 PG/ML
WBC # FLD AUTO: 5.37 K/UL

## 2025-02-15 ENCOUNTER — RX RENEWAL (OUTPATIENT)
Age: 75
End: 2025-02-15

## 2025-07-31 ENCOUNTER — RX RENEWAL (OUTPATIENT)
Age: 75
End: 2025-07-31

## 2025-07-31 RX ORDER — MULTIVIT WITH MIN/MFOLATE/K2 340-15/3 G
50 MCG POWDER (GRAM) ORAL
Qty: 90 | Refills: 0 | Status: ACTIVE | COMMUNITY
Start: 2025-07-31 | End: 1900-01-01